# Patient Record
Sex: FEMALE | Race: WHITE | NOT HISPANIC OR LATINO | Employment: FULL TIME | URBAN - METROPOLITAN AREA
[De-identification: names, ages, dates, MRNs, and addresses within clinical notes are randomized per-mention and may not be internally consistent; named-entity substitution may affect disease eponyms.]

---

## 2023-08-10 ENCOUNTER — OFFICE VISIT (OUTPATIENT)
Dept: FAMILY MEDICINE CLINIC | Facility: CLINIC | Age: 28
End: 2023-08-10
Payer: COMMERCIAL

## 2023-08-10 ENCOUNTER — LAB (OUTPATIENT)
Dept: LAB | Facility: HOSPITAL | Age: 28
End: 2023-08-10
Payer: COMMERCIAL

## 2023-08-10 VITALS
TEMPERATURE: 97.8 F | WEIGHT: 179 LBS | OXYGEN SATURATION: 98 % | BODY MASS INDEX: 28.77 KG/M2 | HEART RATE: 98 BPM | DIASTOLIC BLOOD PRESSURE: 88 MMHG | SYSTOLIC BLOOD PRESSURE: 138 MMHG | HEIGHT: 66 IN

## 2023-08-10 DIAGNOSIS — Z82.49 FAMILY HISTORY OF EARLY CAD: ICD-10-CM

## 2023-08-10 DIAGNOSIS — I10 PRIMARY HYPERTENSION: Primary | ICD-10-CM

## 2023-08-10 PROCEDURE — 80061 LIPID PANEL: CPT | Performed by: INTERNAL MEDICINE

## 2023-08-10 PROCEDURE — 36415 COLL VENOUS BLD VENIPUNCTURE: CPT | Performed by: INTERNAL MEDICINE

## 2023-08-10 PROCEDURE — 84443 ASSAY THYROID STIM HORMONE: CPT | Performed by: INTERNAL MEDICINE

## 2023-08-10 PROCEDURE — 85027 COMPLETE CBC AUTOMATED: CPT | Performed by: INTERNAL MEDICINE

## 2023-08-10 PROCEDURE — 99204 OFFICE O/P NEW MOD 45 MIN: CPT | Performed by: INTERNAL MEDICINE

## 2023-08-10 RX ORDER — LABETALOL 100 MG/1
100 TABLET, FILM COATED ORAL 2 TIMES DAILY
Qty: 180 TABLET | Refills: 2 | Status: SHIPPED | OUTPATIENT
Start: 2023-08-10

## 2023-08-10 RX ORDER — NORGESTIMATE AND ETHINYL ESTRADIOL 0.25-0.035
1 KIT ORAL DAILY
COMMUNITY

## 2023-08-10 RX ORDER — LABETALOL 100 MG/1
100 TABLET, FILM COATED ORAL 2 TIMES DAILY
COMMUNITY
End: 2023-08-10 | Stop reason: SDUPTHER

## 2023-08-10 NOTE — PROGRESS NOTES
Valeria García  1995  [id number]  Patient Care Team:  Ron Shelby MD as PCP - General (Internal Medicine)    Valeria García is a 27 y.o. female here today to establish care.  This patient is accompanied by their self who contributes to the history of their care.    Chief Complaint:    Chief Complaint   Patient presents with    Hypertension        History of Present Illness:      She has a history preclampsia, child [age] year old. She had been treated with labetolol which she did take until 4 days ago. Prior to resuming was 150/100. She checks bid- was running in the 130's. She denies chestpain, SOA, edema.. She did not have any SE with labetolol. Non smoker, occaionsally will vape.  Does have a precautions family history of coronary artery disease with her father  at age 44 secondary to an MI.  She does not recall having fasting lipids ever drawn.  Denies any headaches.  No chest pain shortness of breath orthopnea or PND.  No edema.    Past Medical History:   Diagnosis Date    Anemia     Hypertension        Past Surgical History:   Procedure Laterality Date     SECTION          Family History   Problem Relation Age of Onset    Other Mother         dvt    Heart disease Father         mi age 44    Liver disease Father     Heart attack Father     Hypertension Father     Cancer Neg Hx     Diabetes Neg Hx        Social History     Socioeconomic History    Marital status:    Tobacco Use    Smoking status: Former     Packs/day: 0.50     Years: 10.00     Pack years: 5.00     Types: Cigarettes     Quit date: 2021     Years since quittin.5    Smokeless tobacco: Never   Vaping Use    Vaping Use: Some days    Substances: Nicotine    Devices: Disposable   Substance and Sexual Activity    Alcohol use: Never    Drug use: Never    Sexual activity: Yes     Partners: Male       No Known Allergies    Review of Systems:    Review of Systems   Constitutional: Negative.    Eyes: Negative.   "  Respiratory:  Negative for shortness of breath.    Cardiovascular:  Negative for chest pain, palpitations and leg swelling.   Gastrointestinal: Negative.    Endocrine: Negative.    Genitourinary: Negative.    Musculoskeletal: Negative.    Neurological: Negative.      Vitals:    08/10/23 1327 08/10/23 1342   BP: 146/90 138/88   BP Location: Left arm    Patient Position: Sitting    Cuff Size: Adult    Pulse: 98    Temp: 97.8 øF (36.6 øC)    TempSrc: Temporal    SpO2: 98%    Weight: 81.2 kg (179 lb)    Height: 167 cm (65.75\")      Body mass index is 29.11 kg/mý.      Current Outpatient Medications:     labetalol (NORMODYNE) 100 MG tablet, Take 1 tablet by mouth 2 (Two) Times a Day., Disp: 180 tablet, Rfl: 2    norgestimate-ethinyl estradiol (Dilia) 0.25-35 MG-MCG per tablet, Take 1 tablet by mouth Daily., Disp: , Rfl:     Physical Exam:    Physical Exam  Vitals and nursing note reviewed.   Constitutional:       General: She is not in acute distress.     Appearance: She is well-developed. She is not diaphoretic.   HENT:      Head: Normocephalic and atraumatic.      Right Ear: External ear normal.      Left Ear: External ear normal.      Mouth/Throat:      Pharynx: No oropharyngeal exudate.   Eyes:      General: No scleral icterus.        Right eye: No discharge.      Conjunctiva/sclera: Conjunctivae normal.   Neck:      Thyroid: No thyromegaly.      Vascular: No JVD.      Trachea: No tracheal deviation.   Cardiovascular:      Rate and Rhythm: Normal rate and regular rhythm.      Heart sounds: Normal heart sounds.      Comments: PMI nondisplaced  Pulmonary:      Effort: Pulmonary effort is normal.      Breath sounds: Normal breath sounds. No wheezing or rales.   Abdominal:      General: Bowel sounds are normal.      Palpations: Abdomen is soft.      Tenderness: There is no abdominal tenderness. There is no guarding or rebound.   Musculoskeletal:      Cervical back: Normal range of motion and neck supple. "   Lymphadenopathy:      Cervical: No cervical adenopathy.   Skin:     General: Skin is warm and dry.      Capillary Refill: Capillary refill takes less than 2 seconds.      Coloration: Skin is not pale.      Findings: No rash.   Neurological:      Mental Status: She is alert and oriented to person, place, and time.      Motor: No abnormal muscle tone.      Coordination: Coordination normal.   Psychiatric:         Judgment: Judgment normal.       Procedures    Results Review:    I reviewed the patient's new clinical results.    Assessment/Plan:    Problem List Items Addressed This Visit          Cardiac and Vasculature    Primary hypertension - Primary    Relevant Medications    labetalol (NORMODYNE) 100 MG tablet    Other Relevant Orders    Lipid Panel    TSH Rfx On Abnormal To Free T4    CBC (No Diff)       Family History    Family history of early CAD    Overview     Father  age 45 from MI.  Fasting lipid profile.  Work on nicotine cessation            Plan of care reviewed with patient at the conclusion of today's visit. Education was provided regarding diagnosis and management.  Patient verbalizes understanding of and agreement with management plan.    Return in about 6 months (around 2/10/2024) for htn.    Ron Shelby MD      Please note than portions of this note were completed [facility] a Voice Recognition Program

## 2023-08-11 LAB
CHOLEST SERPL-MCNC: 203 MG/DL (ref 0–200)
DEPRECATED RDW RBC AUTO: 40.7 FL (ref 37–54)
ERYTHROCYTE [DISTWIDTH] IN BLOOD BY AUTOMATED COUNT: 15.5 % (ref 12.3–15.4)
HCT VFR BLD AUTO: 37.9 % (ref 34–46.6)
HDLC SERPL-MCNC: 46 MG/DL (ref 40–60)
HGB BLD-MCNC: 11.9 G/DL (ref 12–15.9)
LDLC SERPL CALC-MCNC: 116 MG/DL (ref 0–100)
LDLC/HDLC SERPL: 2.39 {RATIO}
MCH RBC QN AUTO: 23.1 PG (ref 26.6–33)
MCHC RBC AUTO-ENTMCNC: 31.4 G/DL (ref 31.5–35.7)
MCV RBC AUTO: 73.4 FL (ref 79–97)
PLATELET # BLD AUTO: 282 10*3/MM3 (ref 140–450)
PMV BLD AUTO: 9.3 FL (ref 6–12)
RBC # BLD AUTO: 5.16 10*6/MM3 (ref 3.77–5.28)
TRIGL SERPL-MCNC: 236 MG/DL (ref 0–150)
TSH SERPL DL<=0.05 MIU/L-ACNC: 2.8 UIU/ML (ref 0.27–4.2)
VLDLC SERPL-MCNC: 41 MG/DL (ref 5–40)
WBC NRBC COR # BLD: 6.05 10*3/MM3 (ref 3.4–10.8)

## 2023-08-13 NOTE — PROGRESS NOTES
Mediterranean diet  for ldl. Rbc indicates suggest possible.early iron deficiency..324 mg.otc.iron daily..repeat 6 mon

## 2024-02-29 ENCOUNTER — OFFICE VISIT (OUTPATIENT)
Dept: FAMILY MEDICINE CLINIC | Facility: CLINIC | Age: 29
End: 2024-02-29
Payer: COMMERCIAL

## 2024-02-29 VITALS
HEART RATE: 96 BPM | OXYGEN SATURATION: 97 % | DIASTOLIC BLOOD PRESSURE: 82 MMHG | SYSTOLIC BLOOD PRESSURE: 124 MMHG | HEIGHT: 66 IN | BODY MASS INDEX: 29.54 KG/M2 | WEIGHT: 183.8 LBS

## 2024-02-29 DIAGNOSIS — I10 PRIMARY HYPERTENSION: ICD-10-CM

## 2024-02-29 DIAGNOSIS — E66.3 OVERWEIGHT (BMI 25.0-29.9): ICD-10-CM

## 2024-02-29 DIAGNOSIS — E78.49 OTHER HYPERLIPIDEMIA: Primary | ICD-10-CM

## 2024-02-29 RX ORDER — LABETALOL 100 MG/1
100 TABLET, FILM COATED ORAL 2 TIMES DAILY
Qty: 180 TABLET | Refills: 2 | Status: SHIPPED | OUTPATIENT
Start: 2024-02-29

## 2024-02-29 NOTE — PROGRESS NOTES
Valeria García  1995  [id number]  Patient Care Team:  Ron Shelby MD as PCP - General (Internal Medicine)    Valeria García is a 28 y.o. female here today for follow up.     This patient is accompanied by their self who contributes to the history of their care.    Chief Complaint:    Chief Complaint   Patient presents with    Hypertension        History of Present Illness:  I have reviewed and/or updated the patient's past medical, past surgical, family, social history, problem list and allergies as appropriate.     Last visit  She has a history preclampsia, child [age] year old. She had been treated with labetolol which she did take until 4 days ago. Prior to resuming was 150/100. She checks bid- was running in the 130's. She denies chestpain, SOA, edema.. She did not have any SE with labetolol. Non smoker, occaionsally will vape.  Does have a precautions family history of coronary artery disease with her father  at age 44 secondary to an MI.  She does not recall having fasting lipids ever drawn.  Denies any headaches.  No chest pain shortness of breath orthopnea or PND.  No edema.     Current visit  Running higher at home. Diastolics are in the 90's. She has no symptoms. She is taking her labetol only daily. He feels her bp isn higher in the evening when she takes it on the am.  Denies any headaches, edema chest pain shortness of breath orthopnea or PND.  She indicates that her machine is several years old.  She has not changed the batteries.  Additionally she is having trouble losing her pregnancy weight.  Her prepregnancy weight was about 170.  She would like help with this and would like to discuss with the dietitian.  She is attending to Mediterranean style eating.  Eating lots of fish vegetables.  Walks daily for exercise.    Review of Systems   Constitutional:  Negative for unexpected weight gain and unexpected weight loss.        Difficulty with weight loss   Respiratory: Negative.    "  Cardiovascular: Negative.    Gastrointestinal: Negative.        Vitals:    02/29/24 1432   BP: 124/82   Pulse: 96   SpO2: 97%   Weight: 83.4 kg (183 lb 12.8 oz)   Height: 167 cm (65.75\")     Body mass index is 29.89 kg/m².  8/10/2023  13:27 2/29/2024  14:32              BMI   BMI (Calculated) 29.1 29.9        Physical Exam    Procedures    Results Review:    None  Component  Ref Range & Units 6 mo ago   Total Cholesterol  0 - 200 mg/dL 203 High    Triglycerides  0 - 150 mg/dL 236 High    HDL Cholesterol  40 - 60 mg/dL 46   LDL Cholesterol  0 - 100 mg/dL 116 High    VLDL Cholesterol  5 - 40 mg/dL 41 High      Assessment/Plan:    Problem List Items Addressed This Visit       Primary hypertension    Relevant Medications    labetalol (NORMODYNE) 100 MG tablet    Other Relevant Orders    Ambulatory Referral to Nutrition Services    Other hyperlipidemia - Primary    Relevant Orders    Ambulatory Referral to Nutrition Services    Overweight (BMI 25.0-29.9)    Relevant Orders    Ambulatory Referral to Nutrition Services   Will continue Normodyne for right now.  I have asked her to change the batteries in her machine.  If it is still elevated at home I have asked her to bring it in here for nursing visit to check simultaneously with her manual machines.  If it is accurate and remains high at home, we will change medications.  I referred her to dietary nutrition for weight loss counseling and tips.  She seems like she has made some good healthy choices in her diet, could increase her physical activity however is busy with a 2-year-old child. Recommend nutritional counseling and Mediterranean diet. Per  min aerobic physical activity weekly      Plan of care reviewed with patient at the conclusion of today's visit. Education was provided regarding diagnosis and management.  Patient verbalizes understanding of and agreement with management plan.    Return in about 6 months (around 8/29/2024) for htn/lipids.    Ron" FRANKO Shelby MD      Please note than portions of this note were completed wt a Voice Recognition Program

## 2024-09-09 RX ORDER — LABETALOL 100 MG/1
100 TABLET, FILM COATED ORAL 2 TIMES DAILY
Qty: 180 TABLET | Refills: 2 | Status: SHIPPED | OUTPATIENT
Start: 2024-09-09

## 2024-09-09 NOTE — TELEPHONE ENCOUNTER
Rx Refill Note  Requested Prescriptions     Pending Prescriptions Disp Refills    labetalol (NORMODYNE) 100 MG tablet [Pharmacy Med Name: LABETALOL 100MG TABLETS] 180 tablet 2     Sig: TAKE 1 TABLET BY MOUTH TWICE DAILY      Last office visit with prescribing clinician: 2/29/2024   Last telemedicine visit with prescribing clinician: Visit date not found   Next office visit with prescribing clinician: Visit date not found                         Would you like a call back once the refill request has been completed: [] Yes [] No    If the office needs to give you a call back, can they leave a voicemail: [] Yes [] No    Yulissa Dwyer MA  09/09/24, 12:31 EDT